# Patient Record
Sex: MALE | Race: WHITE
[De-identification: names, ages, dates, MRNs, and addresses within clinical notes are randomized per-mention and may not be internally consistent; named-entity substitution may affect disease eponyms.]

---

## 2018-06-05 ENCOUNTER — HOSPITAL ENCOUNTER (OUTPATIENT)
Dept: HOSPITAL 62 - OROUT | Age: 58
Discharge: NURSING, REHAB OR LONG-TERM CARE | End: 2018-06-05
Attending: SURGERY
Payer: MEDICARE

## 2018-06-05 VITALS — DIASTOLIC BLOOD PRESSURE: 82 MMHG | SYSTOLIC BLOOD PRESSURE: 138 MMHG

## 2018-06-05 DIAGNOSIS — E66.9: ICD-10-CM

## 2018-06-05 DIAGNOSIS — F17.210: ICD-10-CM

## 2018-06-05 DIAGNOSIS — E11.9: ICD-10-CM

## 2018-06-05 DIAGNOSIS — Z88.0: ICD-10-CM

## 2018-06-05 DIAGNOSIS — F20.9: ICD-10-CM

## 2018-06-05 DIAGNOSIS — L97.329: Primary | ICD-10-CM

## 2018-06-05 DIAGNOSIS — I10: ICD-10-CM

## 2018-06-05 LAB
ANION GAP SERPL CALC-SCNC: 6 MMOL/L (ref 5–19)
BUN SERPL-MCNC: 11 MG/DL (ref 7–20)
CALCIUM: 9.5 MG/DL (ref 8.4–10.2)
CHLORIDE SERPL-SCNC: 106 MMOL/L (ref 98–107)
CO2 SERPL-SCNC: 30 MMOL/L (ref 22–30)
ERYTHROCYTE [DISTWIDTH] IN BLOOD BY AUTOMATED COUNT: 17.1 % (ref 11.5–14)
GLUCOSE SERPL-MCNC: 81 MG/DL (ref 75–110)
HCT VFR BLD CALC: 33.9 % (ref 37.9–51)
HGB BLD-MCNC: 11.4 G/DL (ref 13.5–17)
MCH RBC QN AUTO: 29.1 PG (ref 27–33.4)
MCHC RBC AUTO-ENTMCNC: 33.7 G/DL (ref 32–36)
MCV RBC AUTO: 86 FL (ref 80–97)
PLATELET # BLD: 358 10^3/UL (ref 150–450)
POTASSIUM SERPL-SCNC: 5.1 MMOL/L (ref 3.6–5)
RBC # BLD AUTO: 3.93 10^6/UL (ref 4.35–5.55)
SODIUM SERPL-SCNC: 142.3 MMOL/L (ref 137–145)
WBC # BLD AUTO: 6.3 10^3/UL (ref 4–10.5)

## 2018-06-05 PROCEDURE — 80048 BASIC METABOLIC PNL TOTAL CA: CPT

## 2018-06-05 PROCEDURE — 36415 COLL VENOUS BLD VENIPUNCTURE: CPT

## 2018-06-05 PROCEDURE — 87075 CULTR BACTERIA EXCEPT BLOOD: CPT

## 2018-06-05 PROCEDURE — 93010 ELECTROCARDIOGRAM REPORT: CPT

## 2018-06-05 PROCEDURE — 87070 CULTURE OTHR SPECIMN AEROBIC: CPT

## 2018-06-05 PROCEDURE — 87205 SMEAR GRAM STAIN: CPT

## 2018-06-05 PROCEDURE — 11042 DBRDMT SUBQ TIS 1ST 20SQCM/<: CPT

## 2018-06-05 PROCEDURE — 87077 CULTURE AEROBIC IDENTIFY: CPT

## 2018-06-05 PROCEDURE — 93005 ELECTROCARDIOGRAM TRACING: CPT

## 2018-06-05 PROCEDURE — 85027 COMPLETE CBC AUTOMATED: CPT

## 2018-06-05 NOTE — OPERATIVE REPORT
Operative Report


DATE OF SURGERY: 06/05/18


PREOPERATIVE DIAGNOSIS: #1 chronic leg left leg ulcer.  2.  Diabetes mellitus 

type 2.  3.  Schizophrenia.  4 tobacco use disorder.  5.  Hypertension


POSTOPERATIVE DIAGNOSIS: #1 chronic leg left leg ulcer.  2.  Diabetes mellitus 

type 2.  3.  Schizophrenia.  4 tobacco use disorder.  5.  Hypertension


OPERATION: Debridement of left lateral malleolus wound.  Sharp, surgical, 

excisional.  Including skin and subcutaneous tissue.


SURGEON: LENNOX WILLIAMS 1ST ASSISTANT: None.


ANESTHESIA: LMAC


TISSUE REMOVED OR ALTERED: Necrotic tissues of the left lateral malleolus 

wound.  Sent for culture.


COMPLICATIONS: 





None.


ESTIMATED BLOOD LOSS: 5 mL.


INTRAOPERATIVE FINDINGS: Of an opening about 5 mm across.  With tunneling 

mostly anteriorly and superiorly so that the wound was about 2.5 cm across.  

The base was thick granulation tissue.  This was excised and submitted for 

pathology as well as much of the overhanging skin and subcutaneous tissue.  The 

resulting wound is approximately 2 cm in diameter.


PROCEDURE: 





PROCEDURE:


 The [left foot ]was prepared with [Betadine] and draped out with sterile 

linen.  After the"universal time-out", in which it was confirmed that the 

patient [did receive antibiotic], the procedure commenced.  


The patient was appropriately anesthetized.


The wound was probed.    The wound was debrided of non viable tissue using 

scalpel and cautery with removal of loose debris, as well. The wound was 

irrigated with [Peroxide] .  The resulting tissues were sent for culture.The 

wound was now irrigated with saline and [Surgicel] placed within it, dressed 

with [Kerlix] and the procedure concluded.

## 2018-06-05 NOTE — DISCHARGE SUMMARY
Discharge Summary (SDC)





- Discharge


Final Diagnosis: 





#1 chronic leg left leg ulcer





2.  Diabetes mellitus type 2.





3.  Schizophrenia.





4 tobacco use disorder.





5.  Hypertension


Date of Surgery: 06/05/18


Discharge Date: 06/05/18


Condition: Fair


Treatment or Instructions: 


Discharge home [after recovery per ASU criteria].


Diet,as tolerated, when fully awake advance as tolerated. 


Activities within moderation encouraged.


Follow up in wound clinic by appointment later this week. Call for appointment.


Leave wounds [covered], [keep clean and dry, until office visit in 1 week].  


Hold of on school/work [until evaluation in office].


Meds per med rec.


May shower [in 48 hrs], [try to keep operated area as dry as possible].


Referrals: 


NATASHA TAVERAS MD [Primary Care Provider] - 


Discharge Diet: Other (Comments) - ADA.


Respiratory Treatments at Home: Deep Breathing/Coughing


Discharge Activity: Activity As Tolerated


Report the Following to Your Physician Immediately: Unusual Bleeding

## 2018-07-28 ENCOUNTER — HOSPITAL ENCOUNTER (EMERGENCY)
Dept: HOSPITAL 62 - ER | Age: 58
Discharge: HOME | End: 2018-07-28
Payer: MEDICARE

## 2018-07-28 VITALS — SYSTOLIC BLOOD PRESSURE: 103 MMHG | DIASTOLIC BLOOD PRESSURE: 57 MMHG

## 2018-07-28 DIAGNOSIS — I10: ICD-10-CM

## 2018-07-28 DIAGNOSIS — F17.210: ICD-10-CM

## 2018-07-28 DIAGNOSIS — Z88.0: ICD-10-CM

## 2018-07-28 DIAGNOSIS — E11.9: ICD-10-CM

## 2018-07-28 DIAGNOSIS — G43.909: Primary | ICD-10-CM

## 2018-07-28 LAB
APPEARANCE UR: CLEAR
APTT PPP: YELLOW S
BILIRUB UR QL STRIP: NEGATIVE
GLUCOSE UR STRIP-MCNC: NEGATIVE MG/DL
KETONES UR STRIP-MCNC: NEGATIVE MG/DL
NITRITE UR QL STRIP: NEGATIVE
PH UR STRIP: 7 [PH] (ref 5–9)
PROT UR STRIP-MCNC: NEGATIVE MG/DL
SP GR UR STRIP: 1.01
UROBILINOGEN UR-MCNC: NEGATIVE MG/DL (ref ?–2)

## 2018-07-28 PROCEDURE — 96372 THER/PROPH/DIAG INJ SC/IM: CPT

## 2018-07-28 PROCEDURE — 81001 URINALYSIS AUTO W/SCOPE: CPT

## 2018-07-28 PROCEDURE — 70450 CT HEAD/BRAIN W/O DYE: CPT

## 2018-07-28 PROCEDURE — 99284 EMERGENCY DEPT VISIT MOD MDM: CPT

## 2018-07-28 NOTE — ER DOCUMENT REPORT
ED Medical Screen (RME)





- General


Chief Complaint: Headache


Stated Complaint: HEADACHE


Time Seen by Provider: 07/28/18 15:43


Mode of Arrival: Ambulatory


Information source: Patient


Notes: 





This is a 58-year-old man who is a resident of the [facility], history of 

schizophrenia, bipolar affective disorder, diabetes who presents to the 

emergency room with a headache for the past 6 days.  Patient does state he has 

had migraines in the past but it has been a few years.  This is not the worst 

headache of his life but it has been lasting 6 days.  He denies fever.  He 

denies photophobia or neck stiffness.  He smokes about 11 cigarettes a day  but 

he smoked for his whole life and used to smoke a pack a day.  He denies any 

neck pain.  He denies any significant weight loss.  He denies any chest pain or 

shortness of breath or persistent cough.


TRAVEL OUTSIDE OF THE [place] IN LAST 30 DAYS: No





- HPI


Onset: Last week


Onset/Duration: Gradual


Quality of pain: Dull


Severity: Moderate


Pain Level: 2


Associated Symptoms: denies: Chest pain, Earache, Fever, Leg swelling, 

Shortness of breath


Exacerbated by: Denies


Relieved by: Denies


Similar symptoms previously: Yes


Recently seen / treated by doctor: No





- Related Data


Smoking: Cigarettes


Frequency of alcohol use: None


Drug Abuse: None


Allergies/Adverse Reactions: 


 





Penicillins Allergy (Verified 07/28/18 14:14)


 











Past Medical History





- General


Information source: Patient





- Social History


Cigarette use (# per day): Yes - 11 cigarettes a day


Chew tobacco use (# tins/day): No


Frequency of alcohol use: None


Drug Abuse: None


Lives with: Other - [facility] assisted living


Family history: None





- Past Medical History


Cardiac Medical History: Reports: Hx Hypercholesterolemia, Hx Hypertension


   Denies: Hx Coronary Artery Disease, Hx Heart Attack


Pulmonary Medical History: 


   Denies: Hx Asthma, Hx Bronchitis, Hx COPD


   Comment Only: Hx Pneumonia - "Neurocognitive Disorder"


Neurological Medical History: Denies: Hx Cerebrovascular Accident, Hx Seizures


Endocrine Medical History: Reports: Hx Diabetes Mellitus Type 2


Renal/ Medical History: Denies: Hx Peritoneal Dialysis


Musculoskeltal Medical History: Denies Hx Arthritis


Psychiatric Medical History: Reports: Hx Bipolar Disorder, Hx Depression, Hx 

Schizophrenia


Past Surgical History: Reports: Hx Orthopedic Surgery - Left Knee





- Immunizations


Immunizations up to date: Yes


Hx Diphtheria, Pertussis, Tetanus Vaccination: No


History of Influenza Vaccine for 10/2017 - 3/2018 Season: Yes


Influenza Administration Date for 10/2017 - 3/2018 Season: 10/01/17





Review of Systems





- Review of Systems


Constitutional: denies: Chills, Fever


EENT: denies: Eye discharge, Nose congestion, Sinus discharge, Vertigo


Cardiovascular: denies: Chest pain, Palpitations, Heart racing


Respiratory: denies: Hemoptysis, Short of breath, Wheezing


Gastrointestinal: denies: Nausea, Vomiting


Genitourinary: denies: Dysuria, Discharge


Male Genitourinary: No symptoms reported


Musculoskeletal: No symptoms reported, Other - Lower extremity swelling (which 

patient states is actually improved)..  denies: Back pain


Skin: No symptoms reported


Hematologic/Lymphatic: No symptoms reported


Neurological/Psychological: See HPI, Headaches.  denies: Weakness, Paralysis, 

Seizure, Lost consciousness, Speech impairment, Numbness





Physical Exam





- Vital signs


Vitals: 


 











Temp Pulse Resp BP Pulse Ox


 


 97.8 F   61   20   104/55 L  98 


 


 07/28/18 14:45  07/28/18 14:45  07/28/18 14:45  07/28/18 14:45  07/28/18 14:45











Notes: 





Physical exam:


 


GENERAL: 58-year-old man, alert and oriented 3, no acute distress





HEAD: Atraumatic, normocephalic.





EYES: Pupils equal round and reactive to light, extraocular movements intact, 

sclera anicteric, conjunctiva are normal.





ENT: TMs normal, nares patent, oropharynx clear without exudates.  Moist mucous 

membranes.





NECK: Normal range of motion, supple without obvious mass or JVD.





LUNGS: Breath sounds clear to auscultation bilaterally and equal.  No wheezes 

rales or rhonchi.





HEART: Regular rate and rhythm without murmurs, rubs or gallops.





ABDOMEN: Soft, normoactive bowel sounds.  No tenderness to palpation.  No 

guarding, no rebound.  No masses appreciated.





EXTREMITIES: Normal range of motion, no pitting or edema.  No clubbing or 

cyanosis.





NEUROLOGICAL: Cranial nerves II through XII grossly intact.  Visual fields 

intact.  Normal speech, motor 5/5, sensory grossly intact, cerebellar (finger 

to nose) good, reflexes symmetrical, Romberg negative.  There is no photophobia

, neck stiffness.





PSYCH: Normal mood, normal affect.





SKIN: Warm, Dry, normal turgor, no rashes or lesions noted.





Course





- Re-evaluation


Re-evalutation: 





07/28/18 17:18


I discussed the results of the CT with the patient.  He was given Toradol for 

pain.  He is feeling much better and is smiling on discharge.





- Vital Signs


Vital signs: 


 











Temp Pulse Resp BP Pulse Ox


 


 97.8 F   61   20   104/55 L  98 


 


 07/28/18 14:45  07/28/18 14:45  07/28/18 14:45  07/28/18 14:45  07/28/18 14:45














- Diagnostic Test


Radiology reviewed: Image reviewed, Reports reviewed - CT of the head shows no 

acute bleed or mass





Doctor's Discharge





- Discharge


Clinical Impression: 


 Migraine headache





Condition: Stable


Disposition: HOME, SELF-CARE


Additional Instructions: 


As we discussed, the CT of the head looked good today.


I would like you to take ibuprofen every 4-6 hours for the pain for the next 2-

3 days.


I recommend having your primary care doctor refer you to a neurologist for 

follow-up of migraines.


Return to the emergency room for worsening headache, fever (temperature 100.5), 

or any concerns getting worse.


Prescriptions: 


Ibuprofen 400 mg PO Q6HP PRN #14 tablet


 PRN Reason: 


Referrals: 


NATASHA TAVERAS MD [Primary Care Provider] - Follow up as needed

## 2018-07-28 NOTE — RADIOLOGY REPORT (SQ)
EXAM DESCRIPTION:  CT HEAD WITHOUT



COMPLETED DATE/TIME:  7/28/2018 4:02 pm



REASON FOR STUDY:  severe headache



COMPARISON:  None.



TECHNIQUE:  Axial images acquired through the brain without intravenous contrast.  Images reviewed wi
th bone, brain and subdural windows.  Additional sagittal and coronal reconstructions were generated.
 Images stored on PACS.

All CT scanners at this facility use dose modulation, iterative reconstruction, and/or weight based d
osing when appropriate to reduce radiation dose to as low as reasonably achievable (ALARA).

[facility]: Dose Right  [facility]: CareDose    [facility]: Dose Right    [facility]: Teradose 4D    [facility]: Smart Technologies



RADIATION DOSE:  CT Rad equipment meets quality standard of care and radiation dose reduction techniq
ues were employed. CTDIvol: 53.2 mGy. DLP: 1017 mGy-cm. mGy.



LIMITATIONS:  None.



FINDINGS:  VENTRICLES: Normal size and contour.

CEREBRUM: No masses.  No hemorrhage.  No midline shift.  No evidence for acute infarction. Normal gra
y/white matter differentiation. No areas of low density in the white matter.

CEREBELLUM: No masses.  No hemorrhage.  No alteration of density.  No evidence for acute infarction.

EXTRAAXIAL SPACES: No fluid collections.  No masses.

ORBITS AND GLOBE: No intra- or extraconal masses.  Normal contour of globe without masses.

CALVARIUM: No fracture.

PARANASAL SINUSES: No fluid or mucosal thickening.

SOFT TISSUES: No mass or hematoma.

OTHER: No other significant finding.



IMPRESSION:  NORMAL BRAIN CT WITHOUT CONTRAST.

EVIDENCE OF ACUTE STROKE: NO.



COMMENT:  Quality ID # 436: Final reports with documentation of one or more dose reduction techniques
 (e.g., Automated exposure control, adjustment of the mA and/or kV according to patient size, use of 
iterative reconstruction technique)



TECHNICAL DOCUMENTATION:  JOB ID:  [id number]

 2011 [employer]- All Rights Reserved



Reading location - IP/workstation name: [email or web address]

## 2019-09-30 ENCOUNTER — HOSPITAL ENCOUNTER (OUTPATIENT)
Dept: HOSPITAL 62 - OD | Age: 59
End: 2019-09-30
Attending: INTERNAL MEDICINE
Payer: MEDICARE

## 2019-09-30 DIAGNOSIS — M54.2: Primary | ICD-10-CM

## 2019-09-30 PROCEDURE — 72040 X-RAY EXAM NECK SPINE 2-3 VW: CPT

## 2019-09-30 NOTE — RADIOLOGY REPORT (SQ)
EXAM DESCRIPTION:  C SP 3 VWS OR LESS



COMPLETED DATE/TIME:  9/30/2019 11:29 am



REASON FOR STUDY:  CERVICALGIA M54.2  CERVICALGIA



COMPARISON:  None.



NUMBER OF VIEWS:  Three views.



TECHNIQUE:  AP and lateral radiographic images acquired of the cervical spine.



LIMITATIONS:  None.



FINDINGS:  MINERALIZATION: Normal.

ALIGNMENT: Anatomic.

VERTEBRAE: Vertebral bodies of normal height.

DISCS: No significant disc space narrowing.  No large osteophytes.

HARDWARE: None in the spine.

SOFT TISSUES: No masses or calcifications. Lung apices clear.

OTHER: No other significant finding.



IMPRESSION:  NO SIGNIFICANT RADIOGRAPHIC FINDING IN THE CERVICAL SPINE.



TECHNICAL DOCUMENTATION:  JOB ID:  [id number]

 2011 [employer]- All Rights Reserved



Reading location - IP/workstation name: [email or web address]

## 2020-07-29 ENCOUNTER — HOSPITAL ENCOUNTER (OUTPATIENT)
Dept: HOSPITAL 62 - SP | Age: 60
End: 2020-07-29
Attending: INTERNAL MEDICINE
Payer: MEDICARE

## 2020-07-29 DIAGNOSIS — R22.42: Primary | ICD-10-CM

## 2020-07-29 PROCEDURE — 93971 EXTREMITY STUDY: CPT

## 2020-07-29 NOTE — RADIOLOGY REPORT (SQ)
EXAM DESCRIPTION:  VENOUS UNILATERAL LOWER



IMAGES COMPLETED DATE/TIME:  7/29/2020 11:19 am



REASON FOR STUDY:  LLE EDEMA R22.42  LOCALIZED SWELLING, MASS AND LUMP, LEFT LOWER LIMB



COMPARISON:  1/3/2018.



TECHNIQUE:  Dynamic and static gray scale and color images acquired of the left leg venous system. Se
lected spectral images acquired with additional compression and augmentation maneuvers. The contralat
eral common femoral vein and saphenofemoral junction were also imaged. Images stored on PACS.



LIMITATIONS:  None.



FINDINGS:  COMMON FEMORAL: Normal phasicity, compression and augmentation. No visualized echogenic ma
terial on gray scale. No defects on color images.

FEMORAL: Normal compression and augmentation. No visualized echogenic material on gray scale. No defe
cts on color images.

POPLITEAL: Normal compression, augmentation. No visualized echogenic material on gray scale. No defec
ts on color images.

CALF VESSELS: Normal compression, augmentation. No visualized echogenic material on gray scale. No de
fects on color images.

GSV and SSV: Normal compression, augmentation. No visualized echogenic material on gray scale. No def
ects on color images.

ANY DEEP VENOUS INSUFFICIENCY: Not evaluated.

ANY EVIDENCE OF POPLITEAL CYST: No.

OTHER: Subcutaneous edema.

CONTRALATERAL COMMON FEMORAL VEIN AND SAPHENOFEMORAL JUNCTION:

Normal phasicity, compression and augmentation. No visualized echogenic material on gray scale. No de
fects on color images.



IMPRESSION:  NO EVIDENCE OF DVT OR SVT IN THE LEFT LEG.



TECHNICAL DOCUMENTATION:  JOB ID:  [id number]

 2011 [employer]- All Rights Reserved



Reading location - IP/workstation name: [email or web address]

## 2020-11-30 ENCOUNTER — HOSPITAL ENCOUNTER (OUTPATIENT)
Dept: HOSPITAL 62 - RAD | Age: 60
End: 2020-11-30
Attending: FAMILY MEDICINE
Payer: MEDICARE

## 2020-11-30 DIAGNOSIS — M84.371A: Primary | ICD-10-CM

## 2020-11-30 PROCEDURE — 78315 BONE IMAGING 3 PHASE: CPT

## 2020-11-30 PROCEDURE — A9503 TC99M MEDRONATE: HCPCS

## 2020-11-30 NOTE — RADIOLOGY REPORT (SQ)
EXAM DESCRIPTION:  NM 3 PHASE BONE SCAN



IMAGES COMPLETED DATE/TIME:  11/30/2020 11:20 am



REASON FOR STUDY:  M84.371A STRESS FRACTURE, RIGHT ANKLE, INITIAL ENCOUNTER FOR FRACTURE M84.371A  ST
RESS FRACTURE, RIGHT ANKLE, INITIAL ENCOUNTER FOR



COMPARISON:  Outside images dated 11/18/2020



RADIONUCLIDE AND DOSE:  20 millicuries Tc99m MDP.

The route of agent administration:  Intravenous.



ADDITIONAL DRUGS AND DOSES:  None.



TECHNIQUE:  Following injection of the radiopharmaceutical, serial blood flow images acquired.  Equil
ibrium blood pool images then acquired.  Routine delayed images at 3 hours acquired of the areas of c
linical concern with additional focused images as needed.

AREA OF INTEREST: Right ankle



LIMITATIONS:  None.



FINDINGS:  VASCULAR FLOW IMAGES: There is increased blood flow to the right ankle and foot.

BLOOD POOL IMAGES: There is increased blood pool activity in the right ankle.

BONES: There is increased delayed uptake in the right ankle in the region of the distal tibia, the ca
lcaneus, the navicular, and the talus.

KIDNEYS: Symmetric excretion without obstruction.

OTHER: No other significant finding.



IMPRESSION:  There is hyperemia with increased blood pool activity in increased delayed uptake in the
 right ankle as described.  Cannot exclude stress fractures.  Cannot exclude osteomyelitis.  Consider
 MRI for further evaluation.



COMMENT:  Quality measure 147:  Current bone scan is compared with any available plain radiographs, p
rior bone scans, and CT/MRI.



TECHNICAL DOCUMENTATION:  JOB ID:  [id number]

 2011 [employer]- All Rights Reserved



Reading location - IP/workstation name: [email or web address]

## 2020-12-21 ENCOUNTER — HOSPITAL ENCOUNTER (OUTPATIENT)
Dept: HOSPITAL 62 - RAD | Age: 60
End: 2020-12-21
Attending: FAMILY MEDICINE
Payer: MEDICARE

## 2020-12-21 DIAGNOSIS — M84.371A: Primary | ICD-10-CM

## 2020-12-21 NOTE — RADIOLOGY REPORT (SQ)
EXAM DESCRIPTION:  MRI RT LOWER EXTREMITY WITHOUT



IMAGES COMPLETED DATE/TIME:  12/21/2020 12:05 pm



COMPARISON:  Right ankle radiograph 11/18/2018.



TECHNIQUE:  Right ankle images acquired and stored on PACS. Multiplanar images include fat sensitive 
sequences as T1, fluid sensitive sequences as FST2/STIR, cartilage sensitive sequences as FSPD, and g
radient echo sequences.



LIMITATIONS:  None.



FINDINGS:  BONE MARROW: There is abnormal bone marrow signal in the distal tibia, distal talus, navic
ular, and calcaneus.  No acute fracture line.

EFFUSIONS: Small subtalar fusion.  No tibiotalar effusion. No loose bodies.

OSSEOUS ARTICULATIONS: There is subchondral cystic change at the talocalcaneal and talonavicular join
ts.  Subchondral cystic change at the anterior calcaneus.

TALAR DOME AND TIBIAL PLAFOND: Osteochondral defect at the tibial articular surface with associated b
one marrow edema.  Talar dome has a normal appearance with no osteochondral lesion.  Distal fibula is
 intact.  .

ACHILLES TENDON: Intact without partial or full-thickness tear.  No adjacent bursal fluid or edema.

TIBIALIS ANTERIOR TENDON: Intact without edema at the 1st MT attachment.

TIBIALIS POSTERIOR TENDON: Normal morphology and no edema at the navicular attachment. No tendon shea
th fluid.

FLEXOR HALLUCIS LONGUS AND FLEXOR DIGITORUM TENDONS: Normal morphology and no tendon sheath fluid. No
 edema of the os trigonum.

PERONEUS LONGUS AND BREVIS TENDON: Normal morphology and no tendon sheath fluid. No subluxation.

ATFL, CFL, PTFL: Intact. No thickening or signal alteration. No malcolm-ligamentous fluid.

DELTOID LIGAMENT: Visualized components intact.

TARSAL TUNNEL: No masses. No muscle atrophy.

SINUS TARSI: Abnormal signal with subchondral cystic change and edema as well is increased soft tissu
e attenuation.  .

PLANTAR FASCIA: No signal alteration or tear.

ADJACENT SOFT TISSUES: No masses.

OTHER: No other significant finding.



IMPRESSION:  Osteochondral lesion at the articular surface distal tibia with associated nondisplaced 
bony defect and bone marrow edema.  Bone marrow edema at the distal talus, distal calcaneus, and nora
cular bone with associated soft tissue findings suggestive of tarsal tunnel syndrome.  Ligaments and 
tendons appear intact.



TECHNICAL DOCUMENTATION:  JOB ID: [id number]

 [employer]- All Rights Reserved



REASON FOR STUDY:  (M84.371A)STRESS FRACTURE, RIGHT ANKLE, INITIAL ENCOUNTER FOR FRACTURE M84.371A  S
TRESS FRACTURE, RIGHT ANKLE, INITIAL ENCOUNTER FOR

(M84.371A)STRESS FRACTURE, RIGHT ANKLE, INITIAL ENCOUNTER FOR FRACTURE M84.371A  STRESS FRACTURE, RIG
HT ANKLE, INITIAL ENCOUNTER FOR.  Pain, swelling, decreased range of motion, muscle pain.  Diffuse an
kle pain for 2 months.  No recent trauma.  Diabetes.  History of prior foot surgery.



Reading location - IP/workstation name: [email or web address]